# Patient Record
Sex: MALE | Race: WHITE
[De-identification: names, ages, dates, MRNs, and addresses within clinical notes are randomized per-mention and may not be internally consistent; named-entity substitution may affect disease eponyms.]

---

## 2018-06-01 ENCOUNTER — HOSPITAL ENCOUNTER (OUTPATIENT)
Dept: HOSPITAL 97 - ER | Age: 68
Setting detail: OBSERVATION
LOS: 1 days | Discharge: HOME | End: 2018-06-02
Attending: INTERNAL MEDICINE | Admitting: INTERNAL MEDICINE
Payer: COMMERCIAL

## 2018-06-01 VITALS — OXYGEN SATURATION: 98 %

## 2018-06-01 VITALS — BODY MASS INDEX: 47.2 KG/M2

## 2018-06-01 DIAGNOSIS — E11.9: ICD-10-CM

## 2018-06-01 DIAGNOSIS — I48.91: ICD-10-CM

## 2018-06-01 DIAGNOSIS — I10: ICD-10-CM

## 2018-06-01 DIAGNOSIS — Z96.651: ICD-10-CM

## 2018-06-01 DIAGNOSIS — G45.9: Primary | ICD-10-CM

## 2018-06-01 DIAGNOSIS — Z86.73: ICD-10-CM

## 2018-06-01 DIAGNOSIS — E66.01: ICD-10-CM

## 2018-06-01 DIAGNOSIS — Z95.1: ICD-10-CM

## 2018-06-01 LAB
ALBUMIN SERPL BCP-MCNC: 3.4 G/DL (ref 3.2–5.5)
ALP SERPL-CCNC: 62 IU/L (ref 42–121)
ALT SERPL W P-5'-P-CCNC: 14 IU/L (ref 10–60)
AST SERPL W P-5'-P-CCNC: 15 IU/L (ref 10–42)
BUN BLD-MCNC: 15 MG/DL (ref 6–20)
CKMB CREATINE KINASE MB: 2.6 NG/ML (ref 0.3–4)
GLUCOSE SERPLBLD-MCNC: 71 MG/DL (ref 65–120)
HCT VFR BLD CALC: 40.1 % (ref 39.6–49)
INR BLD: 1.12
LYMPHOCYTES # SPEC AUTO: 1.9 K/UL (ref 0.7–4.9)
MAGNESIUM SERPL-MCNC: 2 MG/DL (ref 1.8–2.5)
MCH RBC QN AUTO: 28.5 PG (ref 27–35)
MCV RBC: 87.8 FL (ref 80–100)
PMV BLD: 9 FL (ref 7.6–11.3)
POTASSIUM SERPL-SCNC: 4 MEQ/L (ref 3.6–5)
RBC # BLD: 4.56 M/UL (ref 4.33–5.43)

## 2018-06-01 PROCEDURE — 80061 LIPID PANEL: CPT

## 2018-06-01 PROCEDURE — 84484 ASSAY OF TROPONIN QUANT: CPT

## 2018-06-01 PROCEDURE — 70549 MR ANGIOGRAPH NECK W/O&W/DYE: CPT

## 2018-06-01 PROCEDURE — 85025 COMPLETE CBC W/AUTO DIFF WBC: CPT

## 2018-06-01 PROCEDURE — 70450 CT HEAD/BRAIN W/O DYE: CPT

## 2018-06-01 PROCEDURE — 83880 ASSAY OF NATRIURETIC PEPTIDE: CPT

## 2018-06-01 PROCEDURE — 93005 ELECTROCARDIOGRAM TRACING: CPT

## 2018-06-01 PROCEDURE — 81003 URINALYSIS AUTO W/O SCOPE: CPT

## 2018-06-01 PROCEDURE — 85610 PROTHROMBIN TIME: CPT

## 2018-06-01 PROCEDURE — 99285 EMERGENCY DEPT VISIT HI MDM: CPT

## 2018-06-01 PROCEDURE — 83735 ASSAY OF MAGNESIUM: CPT

## 2018-06-01 PROCEDURE — 70544 MR ANGIOGRAPHY HEAD W/O DYE: CPT

## 2018-06-01 PROCEDURE — 82962 GLUCOSE BLOOD TEST: CPT

## 2018-06-01 PROCEDURE — 80048 BASIC METABOLIC PNL TOTAL CA: CPT

## 2018-06-01 PROCEDURE — 70553 MRI BRAIN STEM W/O & W/DYE: CPT

## 2018-06-01 PROCEDURE — 71045 X-RAY EXAM CHEST 1 VIEW: CPT

## 2018-06-01 PROCEDURE — 36415 COLL VENOUS BLD VENIPUNCTURE: CPT

## 2018-06-01 PROCEDURE — 82550 ASSAY OF CK (CPK): CPT

## 2018-06-01 PROCEDURE — 85730 THROMBOPLASTIN TIME PARTIAL: CPT

## 2018-06-01 PROCEDURE — 80076 HEPATIC FUNCTION PANEL: CPT

## 2018-06-01 PROCEDURE — 93880 EXTRACRANIAL BILAT STUDY: CPT

## 2018-06-01 PROCEDURE — 93306 TTE W/DOPPLER COMPLETE: CPT

## 2018-06-01 PROCEDURE — 82553 CREATINE MB FRACTION: CPT

## 2018-06-01 RX ADMIN — SODIUM CHLORIDE SCH MLS: 0.9 INJECTION, SOLUTION INTRAVENOUS at 22:40

## 2018-06-01 RX ADMIN — HUMAN INSULIN SCH: 100 INJECTION, SOLUTION SUBCUTANEOUS at 21:00

## 2018-06-01 RX ADMIN — SODIUM CHLORIDE SCH MLS: 0.9 INJECTION, SOLUTION INTRAVENOUS at 16:00

## 2018-06-01 RX ADMIN — HUMAN INSULIN SCH: 100 INJECTION, SOLUTION SUBCUTANEOUS at 16:30

## 2018-06-01 NOTE — EKG
Test Date:    2018-06-01               Test Time:    11:32:53

Technician:   QUINTON                                     

                                                     

MEASUREMENT RESULTS:                                       

Intervals:                                           

Rate:         71                                     

MD:           168                                    

QRSD:         94                                     

QT:           376                                    

QTc:          408                                    

Axis:                                                

P:            31                                     

MD:           168                                    

QRS:          27                                     

T:            44                                     

                                                     

INTERPRETIVE STATEMENTS:                                       

                                                     

Normal sinus rhythm

Normal ECG

Compared to ECG 07/29/2016 17:02:59

No significant changes



Electronically Signed On 06-01-18 13:34:16 CDT by Solitario Yañez

## 2018-06-01 NOTE — XMS REPORT
Clinical Summary

 Created on:2018



Patient:Kevin Pichardo

Sex:Male

:1950

External Reference #:[id number]





Demographics







 Address  [address] [place] [address]

 

 Home Phone  [phone]

 

 Email Address  [email or web address]

 

 Preferred Language  English

 

 Marital Status  Unknown

 

 Pentecostal Affiliation  Unknown

 

 Race  Unknown

 

 Ethnic Group   or 









Author







 Organization  [facility]

 

 Address  [address] [place] [address]

 

 Phone  [phone]









Support







 Name  Relationship  Address  Phone

 

 Unavailable  Unavailable  Unavailable  [phone]









Care Team Providers







 Name  Role  Phone

 

 Unavailable  Primary Care Provider  Unavailable









Allergies

No Known Allergies



Current Medications







 Prescription  Sig.  Disp.  Refills  Start Date  End Date  Status

 

 lisinopril  Take 10 mg by          Active



 (PRINIVIL,ZESTRIL) 10  mouth daily.          



 MG tablet            

 

 metoprolol  Take 50 mg by          Active



 (LOPRESSOR) 50 MG  mouth 2 (two)          



 tablet  times daily.          

 

 metFORMIN  Take 1,000 mg by          Active



 (GLUCOPHAGE) 1000 MG  mouth 2 (two)          



 tablet  times daily with          



   breakfast and          



   dinner.          

 

 clopidogrel (PLAVIX)  Take 75 mg by          Active



 75 mg tablet  mouth daily.          

 

 aspirin 325 MG tablet  Take 1 tablet  30 tablet  0  2016    Active



   (325 mg total)          



   by mouth daily.          

 

 atorvastatin  Take 0.5 tablets  15 tablet  0  2016  



 (LIPITOR) 80 MG  (40 mg total) by          



 tablet  mouth nightly.          

 

 cyanocobalamin  Take 1 tablet  30 tablet  0  2016  



 (VITAMIN B-12) 100  (100 mcg total)          



 MCG tablet  by mouth daily.          







Active Problems







 Problem  Noted Date

 

 Stroke due to embolism of right posterior cerebral artery (HCC)  07/15/2016

 

 B12 deficiency  2016

 

 Diabetes mellitus (HCC)  

 

 Hypertension  

 

 Coronary artery disease  







Family History







 Medical History  Relation  Name  Comments

 

 Dementia  Father    

 

 Diabetes  Father    

 

 Heart disease  Father    

 

 Hypertension  Father    

 

 Stomach cancer  Mother    









 Relation  Name  Status  Comments

 

 Father      

 

 Mother      







Social History







 Tobacco Use  Types  Packs/Day  Years Used  Date

 

 Never Smoker        









 Smokeless Tobacco: Never Used      









 Alcohol Use  Drinks/Week  oz/Week  Comments

 

 No      









 Sex Assigned at Birth  Date Recorded

 

 Not on file  







Last Filed Vital Signs

Not on file



Plan of Treatment

Not on file



Results

Not on fileafter 2017

## 2018-06-01 NOTE — RAD REPORT
EXAM DESCRIPTION:  RAD - Chest Single View - 6/1/2018 11:45 am

 

CLINICAL HISTORY:  Blurred vision, hypertension, shortness of breath

 

COMPARISON:  June 2016

 

TECHNIQUE:  AP portable chest image was obtained 1118 hours .

 

FINDINGS:  No failure, infiltrate or mass. Lung markings are mildly prominent, similar to comparison.
 Heart and vasculature are normal. No measurable pleural effusion and no pneumothorax. No gross bony 
abnormality seen. No acute aortic findings suspected.

 

IMPRESSION:  No acute cardiopulmonary process.

 

Chest findings are similar to comparison.

## 2018-06-01 NOTE — RAD REPORT
EXAM DESCRIPTION:  CT - Head Brain Wo Cont - 6/1/2018 11:21 am

 

CLINICAL HISTORY:  Acute onset blurred vision

 

COMPARISON:  CT head August 2016

 

TECHNIQUE:  Axial 5 mm thick images of the head were obtained without IV contrast.

 

All CT scans are performed using dose optimization technique as appropriate and may include automated
 exposure control or mA/KV adjustment according to patient size.

 

FINDINGS:  No intracranial hemorrhage, mass, edema or shift of mid-line structures. No acute infarcti
on changes seen. No acute cortical based infarction is identified. Patient has mild to moderate atrop
hy and mild chronic ischemic change. Ventricular size is in proportion to volume loss. Diminished att
enuation is seen in the lateral aspect left cerebellum matching previously noted CVA. There is enceph
alomalacia in the medial right occipital lobe. This would be an in the visual cortex pathway but is s
imilar to the prior study. Visual loss was not further detailed to determine if deficit may localize 
to the medial right occipital lobe.

 

Mastoid air cells and visualized portions of the paranasal sinuses are clear.

 

No acute bony findings.

 

 

IMPRESSION:  No hemorrhage, mass or acute cortical based infarction.

 

Atrophy, chronic ischemic changes, old right occipital and old left cerebellum infarction changes not
ed and stable.

 

Ongoing clinical concerns for acute nonhemorrhagic CVA can be addressed with MR imaging.

## 2018-06-01 NOTE — RAD REPORT
EXAM DESCRIPTION:  MRI - Stroke Protocol - 6/1/2018 3:37 pm

 

CLINICAL HISTORY:   Blurred vision

 

COMPARISON:  June 1, 2018 head CT

 

MRI and MRA 2016

 

TECHNIQUE:  Axial, sagittal and coronal magnetic resonance images of the brain were obtained. 20 cc M
ultiHance was administered. Magnetic resonance angiography of the head and neck was performed. Source
 images were  reviewed and reconstructed at 360 degrees rotation.

 

FINDINGS:   Abnormal signal consistent with gliosis is present within the right temporal lobe extendi
ng into the right occipital lobe and left cerebellum. These likely are the sequela of old infarcts.

 

Diffusion weighted Sign ADC mapping does not reveal evidence of an acute infarction

Sinuses and mastoids are clear.

 

Old lacunar infarcts of the right cerebellum and right thalamus are seen. .

 

An extra-axial fluid collection is not seen. Hydrocephalus is not noted.

 

No abnormal enhancement is seen.

 

Flow within the left vertebral artery is not visualized on the current exam. Flow within the artery w
as seen on the 2016 MRA. The common, internal and external carotid arteries do not demonstrate a sign
ificant abnormality.

 

 The visualized anterior cerebral, middle cerebral, posterior cerebral, basilar and distal internal c
arotid arteries do not demonstrate an acute significant stenosis. An aneurysm is not seen

 

IMPRESSION:  Old cerebellar and cerebral infarcts

 

An acute infarct is not seen

 

Flow within the left vertebral artery is no longer visualized. This has developed since 2016 and prob
ably represents a dissection

 

No acute abnormality involving the arteries of the head

## 2018-06-01 NOTE — EDPHYS
Physician Documentation                                                                           

 [facility]                                                                

Name: Kevin Pichardo                                                                                 

Age: 68 yrs                                                                                       

Sex: Male                                                                                         

: 1950                                                                                   

MRN: [id number]                                                                                   

Arrival Date: 2018                                                                          

Time: 10:43                                                                                       

Account#: [id number]                                                                            

Bed 19                                                                                            

Private MD: Collin Humphreys V                                                                      

ED Physician Lanre Marinelli                                                                       

HPI:                                                                                              

                                                                                             

11:11 This 68 yrs old  Male presents to ER via Wheelchair with complaints of Blurred cp  

      Vision.                                                                                     

11:11 The patient's problem is reported as visual difficulty, blurred vision. Onset: The      cp  

      symptoms/episode began/occurred this morning, at 10:00. Duration: This was a single         

      incident, lasting a few seconds. Context: occurred while the patient was driving.           

11:11 Associated signs and symptoms: Pertinent negatives: abdominal pain, chest pain,         cp  

      headache, numbness, vomiting, weakness. Severity of symptoms: in the emergency              

      department the symptoms have resolved and did so just prior to arrival. Patient's           

      baseline: Neuro: alert and fully oriented, Motor: no deficits, Ambulation: walks            

      without assistance, Speech: normal, The patient has a previous history of CVA, TIA.         

      reports symptoms are similar to previous symptoms when diagnosed with TIA.                  

                                                                                                  

Historical:                                                                                       

- Allergies:                                                                                      

10:52 No Known Allergies;                                                                     aj  

- Home Meds:                                                                                      

11:30 amlodipine 5 mg tab 1 tab once daily [Active]; atorvastatin 40 mg oral tab 1 tab once   em  

      daily [Active]; hydrochlorothiazide 25 mg Oral tab 1 tab once daily [Active];               

      lisinopril 20 mg Oral tab 1 tab once daily [Active]; metoprolol tartrate 50 mg Oral tab     

      1 tab 2 times per day [Active]; warfarin 3 mg Oral tab 2 tabs once daily [Active];          

- PMHx:                                                                                           

10:52 CVA; Diabetes - NIDDM; Hyperlipidemia; Hypertension;                                    aj  

                                                                                                  

- Immunization history:: Adult Immunizations up to date.                                          

- Social history:: Smoking status: Patient/guardian denies using tobacco.                         

- Ebola Screening: : Patient negative for fever greater than or equal to 101.5 degrees            

  Fahrenheit, and additional compatible Ebola Virus Disease symptoms Patient denies               

  exposure to infectious person Patient denies travel to an Ebola-affected area in the            

  21 days before illness onset No symptoms or risks identified at this time.                      

                                                                                                  

                                                                                                  

ROS:                                                                                              

11:20 Constitutional: Negative for body aches, chills, fever, poor PO intake.                 cp  

11:20 Eyes: Positive for history of blurry vision, Negative for discharge, pain, photophobia, cp  

      redness.                                                                                    

11:20 ENT: Negative for drainage from ear(s), ear pain, sore throat, difficulty swallowing,       

      difficulty handling secretions.                                                             

11:20 Neck: Negative for pain with movement, pain at rest, stiffness, tenderness, bony            

      tenderness.                                                                                 

11:20 Cardiovascular: Negative for chest pain, edema, palpitations.                               

11:20 Respiratory: Negative for cough, shortness of breath, wheezing.                             

11:20 Abdomen/GI: Negative for abdominal pain, nausea, vomiting, and diarrhea, constipation,      

      anorexia, dysphagia, black/tarry stool, rectal bleeding.                                    

11:20 Back: Negative for pain at rest, pain with movement, radiated pain.                         

11:20 : Negative for urinary symptoms.                                                          

11:20 MS/extremity: Negative for injury or acute deformity, decreased range of motion, pain,      

      paresthesias.                                                                               

11:20 Skin: Negative for cellulitis, rash.                                                        

11:20 Neuro: Negative for altered mental status, headache, syncope, near syncope, weakness.       

11:20 All other systems are negative.                                                             

                                                                                                  

Exam:                                                                                             

11:27 Constitutional: The patient appears in no acute distress, alert, awake,                 cp  

      non-diaphoretic, non-toxic, well developed, well nourished, obese.                          

11:27 Head/Face:  Normocephalic, atraumatic. Eyes:  Pupils equal round and reactive to light, cp  

      extra-ocular motions intact.  Lids and lashes normal.  Conjunctiva and sclera are           

      non-icteric and not injected.  Cornea within normal limits.  Periorbital areas with no      

      swelling, redness, or edema. ENT:  Nares patent. No nasal discharge, no septal              

      abnormalities noted.  Tympanic membranes are normal and external auditory canals are        

      clear.  Oropharynx with no redness, swelling, or masses, exudates, or evidence of           

      obstruction, uvula midline.  Mucous membranes moist. Chest/axilla:  Normal chest wall       

      appearance and motion.  Nontender with no deformity.  No lesions are appreciated.           

11:27 Cardiovascular: Rate: normal, Rhythm: regular, Pulses: Pulses are 2+ in right radial        

      artery and left radial artery. Edema: is not appreciated, JVD: is not appreciated.          

11:27 Respiratory: the patient does not display signs of respiratory distress,  Respirations:     

      normal, no use of accessory muscles, no retractions, no splinting, no tachypnea,            

      labored breathing, is not present, Breath sounds: are clear throughout, no decreased        

      breath sounds, no stridor, no wheezing.                                                     

11:27 Abdomen/GI: Exam negative for discomfort, distension, guarding, Inspection: abdomen         

      appears normal.                                                                             

11:27 Back: pain, is absent, ROM is normal.                                                       

11:27 Skin: cellulitis, is not appreciated, no rash present.                                      

11:27 Neuro: Orientation: to person, place \T\ time. Mentation: lucid, able to follow commands,   

      Cerebellar function: is grossly normal, Motor: moves all fours, strength is normal,         

      Sensation: no obvious gross deficits, Gait: is steady.                                      

11:57 Radiologist reports: no acute findings                                                    

                                                                                                  

Vital Signs:                                                                                      

10:52  / 81; Pulse 74; Resp 16; Temp 98.3; Pulse Ox 96% on R/A; Weight 142.88 kg;       aj  

      Height 5 ft. 9 in. (175.26 cm);                                                             

11:30  / 82; Pulse 71; Resp 16; Pulse Ox 99% ; Pain 0/10;                               em  

12:30  / 79; Pulse 68; Resp 14; Pulse Ox 100% on R/A;                                   em  

13:30  / 81; Pulse 65; Resp 16; Pulse Ox 99% on R/A; Pain 0/10;                         em  

14:30  / 78; Pulse 77; Resp 18; Pulse Ox 99% on R/A;                                    em  

16:32  / 85; Pulse 74; Resp 16; Temp 97.5(O); Pulse Ox 98% on R/A; Pain 0/10;           em  

10:52 Body Mass Index 46.52 (142.88 kg, 175.26 cm)                                            aj  

                                                                                                  

MDM:                                                                                              

10:57 Patient medically screened.                                                             cp  

12:00 Differential diagnosis: CVA, TIA, metabolic disorder, drug effects.                       

12:35 Data reviewed: vital signs, nurses notes, lab test result(s), EKG, radiologic studies,  cp  

      CT scan, plain films.                                                                       

12:35 Test interpretation: by ED physician or midlevel provider: ECG, plain radiologic        cp  

      studies.                                                                                    

12:40 Physician consultation: Flakito Dyson MD was called at 12:40, was contacted at 12:41,       

      regarding consult, and will see patient in inpatient room.                                  

12:47 Physician consultation: Collin Humphreys MD was called at 12:47, left message on voicemail.   

14:30 Physician consultation: Collin Humphreys MD was contacted at 14:30, regarding admission, to   

      the telemetry unit. patient's condition.                                                    

                                                                                                  

                                                                                             

11:09 Order name: Basic Metabolic Panel; Complete Time: 14:30                                   

                                                                                             

11:09 Order name: BNP; Complete Time: 12:27                                                     

                                                                                             

11:09 Order name: CBC with Diff; Complete Time: 12:01                                           

                                                                                             

12:01 Interpretation: Normal except: HGB 13.0; RDW 15.4; EOSINOPHIL % 7.3; EOSA 0.7.            

                                                                                             

11:09 Order name: Ckmb; Complete Time: 14:30                                                    

                                                                                             

11:09 Order name: CPK; Complete Time: 14:30                                                     

                                                                                             

11:09 Order name: LFT's; Complete Time: 14:30                                                   

                                                                                             

11:09 Order name: Magnesium; Complete Time: 14:30                                               

                                                                                             

11:09 Order name: PT-INR; Complete Time: 12:27                                                  

                                                                                             

11:09 Order name: Ptt, Activated; Complete Time: 12:27                                          

                                                                                             

11:09 Order name: Troponin (emerg Dept Use Only); Complete Time: 12:27                          

                                                                                             

12:27 Interpretation: Within normal limits: TROPED < 0.03.                                      

                                                                                             

13:10 Order name: Urine Dipstick--Ancillary (enter results)                                     

                                                                                             

13:10 Order name: Urine Dipstick-Ancillary; Complete Time: 14:30                              [facility]

                                                                                             

15:24 Order name: CKMB Creatine Kinase MB                                                     [facility]

                                                                                             

15:24 Order name: CKMB Creatine Kinase MB                                                     [facility]

                                                                                             

11:09 Order name: XRAY Chest (1 view); Complete Time: 14:30                                     

                                                                                             

11:09 Order name: EKG; Complete Time: 11:09                                                     

                                                                                             

11:09 Order name: CT Head Brain wo Cont; Complete Time: 11:53                                   

                                                                                             

12:03 Interpretation: Report reviewed.                                                          

                                                                                             

12:47 Order name: Stroke Protocol                                                             [facility]

                                                                                             

13:17 Order name: Diet Ada 2000 Obed; Complete Time: 13:18                                     em  

                                                                                             

15:24 Order name: CONS Physician Consult                                                      [facility]

                                                                                             

15:24 Order name: Echo with Doppler                                                           [facility]

                                                                                             

15:24 Order name: Creatine Phosphokinase                                                      [facility]

                                                                                             

15:24 Order name: Creatine Phosphokinase                                                      [facility]

                                                                                             

15:24 Order name: Lipid Profile                                                               [facility]

                                                                                             

15:24 Order name: Lipid Profile                                                               [facility]

                                                                                             

15:24 Order name: Troponin I                                                                  [facility]

                                                                                             

15:24 Order name: Troponin I                                                                  [facility]

                                                                                             

15:24 Order name: Carotid Artery Bilateral                                                    [facility]

                                                                                             

11:09 Order name: Cardiac monitoring; Complete Time: 12:30                                    cp  

                                                                                             

11:09 Order name: EKG - Nurse/Tech; Complete Time: 12:30                                      cp  

                                                                                             

11:09 Order name: IV Saline Lock; Complete Time: 12:30                                        cp  

                                                                                             

11:09 Order name: Labs collected and sent; Complete Time: 12:30                               cp  

                                                                                             

11:09 Order name: O2 Per Protocol; Complete Time: 11:17                                       cp  

                                                                                             

11:09 Order name: O2 Sat Monitoring; Complete Time: 11:17                                     cp  

                                                                                             

11:09 Order name: Urine Dipstick-Ancillary (obtain specimen); Complete Time: 13:30            cp  

                                                                                             

11:09 Order name: Accucheck Blood Glucose; Complete Time: 11:17                               cp  

                                                                                             

13:00 Order name: Labs - recollect needed; Complete Time: 13:17                               bd  

                                                                                                  

Administered Medications:                                                                         

12:20 Drug: Aspirin Chewable Tablet 324 mg Route: PO;                                         em  

13:27 Follow up: Response: No adverse reaction                                                em  

16:25 Drug: Xarelto 15 mg Route: PO;                                                          em  

16:42 Follow up: Response: No adverse reaction                                                em  

                                                                                                  

                                                                                                  

Point of Care Testing:                                                                            

      Blood Glucose:                                                                              

11:10 Blood Glucose: 96 mg/dL;                                                                em  

      Ranges:                                                                                     

      Critical Glucose Levels:Adult <50 mg/dl or >400 mg/dl  <40 mg/dl or >180 mg/dl       

Disposition:                                                                                      

17:30 Chart complete.                                                                         cp  

                                                                                                  

Disposition:                                                                                      

18 14:34 Hospitalization ordered by Collin Humphreys for Observation. Preliminary diagnosis    

  is Transient cerebral ischemic attack, unspecified.                                             

- Bed requested for Telemetry/MedSurg (Inpatient).                                                

- Status is Observation.                                                                      em  

- Condition is Stable.                                                                            

- Problem is new.                                                                                 

- Symptoms are resolved.                                                                          

UTI on Admission? No                                                                              

                                                                                                  

                                                                                                  

                                                                                                  

Addendum:                                                                                         

2018                                                                                        

     14:00 Co-signature as Attending Physician, Lanre Marinelli MD.                                  g
s

                                                                                                  

Signatures:                                                                                       

Dispatcher [employer]                           EDFatou Villatoro Diana, RN                        RN   Taya Nicholas RN                       RN   aj                                                   

Carlos, Ignacio, LVN                       LVN  em                                                   

Sorin Krishnan, PA                         PA   Lanre Brody MD MD                                                      

                                                                                                  

Corrections: (The following items were deleted from the chart)                                    

                                                                                             

12:01 12:01 Normal except: HGB 13.0; RDW 15.4; EOSINOPHIL % 7.3. cp                           cp  

16:13 14:34 Hospitalization Ordered by Collin Humphreys MD for Observation. Preliminary diagnosis dw  

      is Transient cerebral ischemic attack, unspecified. Bed requested for Telemetry/MedSurg     

      (Inpatient). Status is Observation. Condition is Stable. Problem is new. Symptoms are       

      resolved. UTI on Admission? No. cp                                                          

17:12 16:13 2018 14:34 Hospitalization Ordered by Collin Humphreys MD for Observation.      em  

      Preliminary diagnosis is Transient cerebral ischemic attack, unspecified. Bed requested     

      for Telemetry/MedSurg (Inpatient). Status is Observation. Condition is Stable. Problem      

      is new. Symptoms are resolved. UTI on Admission? No. dw                                     

                                                                                                  

**************************************************************************************************

## 2018-06-01 NOTE — ER
Nurse's Notes                                                                                     

 [facility]                                                                

Name: Kevin Pichardo                                                                                 

Age: 68 yrs                                                                                       

Sex: Male                                                                                         

: 1950                                                                                   

MRN: [id number]                                                                                   

Arrival Date: 2018                                                                          

Time: 10:43                                                                                       

Account#: [id number]                                                                            

Bed 19                                                                                            

Private MD: Collin Humphreys V                                                                      

Diagnosis: Transient cerebral ischemic attack, unspecified                                        

                                                                                                  

Presentation:                                                                                     

                                                                                             

10:50 Presenting complaint: Patient states: Sudden onset blurred vision at 1000 this AM, that aj  

      has since resolved. Patient reports similar symptoms just prior to CVA 2 years ago.         

      Transition of care: patient was not received from another setting of care. Onset of         

      symptoms was 2018. Risk Assessment: Do you want to hurt yourself or someone        

      else? Patient reports no desire to harm self or others. Care prior to arrival: None.        

10:50 Method Of Arrival: Wheelchair                                                           aj  

10:50 Acuity: MINGO 3                                                                           aj  

10:53 No acute neurological deficit is noted.                                                 aj  

12:27 Pre-hospital glucose is not applicable to this patient.                                 em  

12:29 Initial Sepsis Screen: Does the patient meet any 2 criteria? No. Patient's initial      em  

      sepsis screen is negative. Does the patient have a suspected source of infection? No.       

      Patient's initial sepsis screen is negative.                                                

                                                                                                  

Triage Assessment:                                                                                

10:52 General: Appears in no apparent distress. comfortable, Behavior is calm, cooperative,   aj  

      appropriate for age. Pain: Denies pain. Neuro: Reports blurred vision resolved.             

      Respiratory: Airway is patent Respiratory effort is even, unlabored, Respiratory            

      pattern is regular, symmetrical. Derm: Skin is intact, is healthy with good turgor,         

      Skin is pink, warm \T\ dry. normal.                                                         

10:53 The onset of the patients symptoms was 2018 at 10:00.                          aj  

                                                                                                  

Stroke Activation:                                                                                

 Physician: Stroke Attending; Name: ; Notified At: ; Arrived At:                                  

 Physician: Chief Stroke Resident; Name: ; Notified At: ; Arrived At:                             

 Physician: Stroke Resident; Name: ; Notified At: ; Arrived At:                                   

 Physician: ED Attending; Name: ; Notified At: ; Arrived At:                                      

 Physician: ED Resident; Name: ; Notified At: ; Arrived At:                                       

10:53 syptoms resolved PTA                                                                    aj  

                                                                                                  

Historical:                                                                                       

- Allergies:                                                                                      

10:52 No Known Allergies;                                                                     aj  

- Home Meds:                                                                                      

11:30 amlodipine 5 mg tab 1 tab once daily [Active]; atorvastatin 40 mg oral tab 1 tab once   em  

      daily [Active]; hydrochlorothiazide 25 mg Oral tab 1 tab once daily [Active];               

      lisinopril 20 mg Oral tab 1 tab once daily [Active]; metoprolol tartrate 50 mg Oral tab     

      1 tab 2 times per day [Active]; warfarin 3 mg Oral tab 2 tabs once daily [Active];          

- PMHx:                                                                                           

10:52 CVA; Diabetes - NIDDM; Hyperlipidemia; Hypertension;                                    aj  

                                                                                                  

- Immunization history:: Adult Immunizations up to date.                                          

- Social history:: Smoking status: Patient/guardian denies using tobacco.                         

- Ebola Screening: : Patient negative for fever greater than or equal to 101.5 degrees            

  Fahrenheit, and additional compatible Ebola Virus Disease symptoms Patient denies               

  exposure to infectious person Patient denies travel to an Ebola-affected area in the            

  21 days before illness onset No symptoms or risks identified at this time.                      

                                                                                                  

                                                                                                  

Screenin:30 Abuse screen: Denies threats or abuse. Nutritional screening: No deficits noted.        em  

      Tuberculosis screening: No symptoms or risk factors identified. Fall Risk None              

      identified.                                                                                 

                                                                                                  

Assessment:                                                                                       

11:10 General: Appears in no apparent distress. comfortable, Behavior is calm, cooperative,   em  

      Reports reports having "vision problems" denies blurry reports "hazy" felt almost the       

      same as last TIA, denies headache, weakness, numbness, N/V. Pain: Denies pain. Neuro:       

      Level of Consciousness is alert, obeys commands, Oriented to person, place, time,           

      situation,  are equal bilaterally Moves all extremities. Speech is normal, Facial      

      symmetry appears normal, Pupils are PERRLA, Denies weakness blurred vision difficulty       

      swallowing, headache. Cardiovascular: Capillary refill < 3 seconds Patient's skin is        

      warm and dry. Respiratory: Airway is patent Respiratory effort is even, unlabored,          

      Respiratory pattern is regular, symmetrical. GI: Abdomen is round non-distended. EENT:      

      No signs and/or symptoms were reported regarding the EENT system. Derm: Skin is intact,     

      Skin is pink, warm \T\ dry. Musculoskeletal: Range of motion: intact in all extremities.    

11:10 Reassessment: I agree with assessment completed by Ignacio Gonzalez LVN .                   aa5 

11:19 Patient has been NPO before screening. The patient is alert, and able to follow         em  

      commands. The patient does not exhibit slurred or garbled speech. The patient is not        

      exhibiting difficulty speaking. The patient does not exhibit difficulty understanding       

      words. The patient is able to swallow own secretions with no drooling or need for           

      suction. Patient tolerated one teaspoon of water. No drooling, immediate coughing,          

      gurgling, or clearing of the throat was noted. The patient tolerated 90mL of water. No      

      drooling, immediate coughing, gurgling, or clearing of the throat was noted. The            

      patient passed the bedside swallow screening. Oral medications may be given as ordered.     

      Contact Physician for further diet orders. Provider notified of bedside swallow             

      screening results: Sorin HOFFMAN.                                                           

12:01 Reassessment: Patient appears in no apparent distress at this time. Patient and/or      em  

      family updated on plan of care and expected duration. Pain level reassessed. Patient is     

      alert, oriented x 3, equal unlabored respirations, skin warm/dry/pink. Patient denies       

      pain at this time.                                                                          

13:00 Reassessment: Patient appears in no apparent distress at this time. Patient and/or      em  

      family updated on plan of care and expected duration. Pain level reassessed. Patient is     

      alert, oriented x 3, equal unlabored respirations, skin warm/dry/pink. labs redrawn and     

      sent to lab.                                                                                

13:45 Reassessment: Patient appears in no apparent distress at this time. Patient and/or      em  

      family updated on plan of care and expected duration. Pain level reassessed. pt eating      

      lunch tray, tolerated well.                                                                 

14:30 Reassessment: Patient appears in no apparent distress at this time. Patient and/or      em  

      family updated on plan of care and expected duration. Pain level reassessed. Patient is     

      alert, oriented x 3, equal unlabored respirations, skin warm/dry/pink.                      

15:35 Reassessment: pt in MRI at this time.                                                   em  

16:36 Reassessment: Patient appears in no apparent distress at this time. Patient and/or      em  

      family updated on plan of care and expected duration. Pain level reassessed. Patient is     

      alert, oriented x 3, equal unlabored respirations, skin warm/dry/pink.                      

17:08 Reassessment: Patient appears in no apparent distress at this time. Patient and/or      em  

      family updated on plan of care and expected duration. Pain level reassessed. Patient is     

      alert, oriented x 3, equal unlabored respirations, skin warm/dry/pink. Patient denies       

      pain at this time.                                                                          

                                                                                                  

Vital Signs:                                                                                      

10:52  / 81; Pulse 74; Resp 16; Temp 98.3; Pulse Ox 96% on R/A; Weight 142.88 kg;       aj  

      Height 5 ft. 9 in. (175.26 cm);                                                             

11:30  / 82; Pulse 71; Resp 16; Pulse Ox 99% ; Pain 0/10;                               em  

12:30  / 79; Pulse 68; Resp 14; Pulse Ox 100% on R/A;                                   em  

13:30  / 81; Pulse 65; Resp 16; Pulse Ox 99% on R/A; Pain 0/10;                         em  

14:30  / 78; Pulse 77; Resp 18; Pulse Ox 99% on R/A;                                    em  

16:32  / 85; Pulse 74; Resp 16; Temp 97.5(O); Pulse Ox 98% on R/A; Pain 0/10;           em  

10:52 Body Mass Index 46.52 (142.88 kg, 175.26 cm)                                            aj  

                                                                                                  

ED Course:                                                                                        

10:43 Patient arrived in ED.                                                                  sb2 

10:44 Collin Humphreys MD is Private Physician.                                                 sb2 

10:51 Triage completed.                                                                       aj  

10:53 Arm band placed on left wrist. Patient placed in an exam room.                          aj  

10:57 Sorin Krishnan PA is PHCP.                                                                cp  

10:57 Lanre Marinelli MD is Attending Physician.                                              cp  

10:59 Ignacio Gonzalez LVN is Primary Nurse.                                                     em  

11:10 Patient has correct armband on for positive identification. Placed in gown. Bed in low  em  

      position. Side rails up X 1. Adult w/ patient.                                              

11:20 CT completed. Patient tolerated procedure well. Patient moved to CT via wheelchair.     sj  

      Patient moved back from CT.                                                                 

11:20 CT Head Brain wo Cont In Process Unspecified.                                           EDMS

11:40 Initial lab(s) drawn, by me, sent to lab. Inserted saline lock: 20 gauge in right       em  

      antecubital area, using aseptic technique. Blood collected.                                 

11:45 XRAY Chest (1 view) In Process Unspecified.                                             EDMS

11:45 EKG done, by EKG tech. reviewed by Sorin HOFFMAN.                                       at1 

12:25 No provider procedures requiring assistance completed.                                  em  

14:30 Patient moved to MRI via wheelchair.                                                    ka  

14:32 Collin Humphreys MD is Hospitalizing Provider.                                            cp  

15:32 Stroke Protocol In Process Unspecified.                                                 EDMS

16:24 Echocardiogram with Doppler done by tech.                                               dt2 

17:09 Patient admitted, IV remains in place.                                                  em  

                                                                                                  

Administered Medications:                                                                         

12:20 Drug: Aspirin Chewable Tablet 324 mg Route: PO;                                         em  

13:27 Follow up: Response: No adverse reaction                                                em  

16:25 Drug: Xarelto 15 mg Route: PO;                                                          em  

16:42 Follow up: Response: No adverse reaction                                                em  

                                                                                                  

                                                                                                  

Point of Care Testing:                                                                            

      Blood Glucose:                                                                              

11:10 Blood Glucose: 96 mg/dL;                                                                em  

      Ranges:                                                                                     

                                                                                                  

Outcome:                                                                                          

14:34 Decision to Hospitalize by Provider.                                                    cp  

17:09 Admitted to Tele accompanied by tech, via wheelchair, room 428, with chart, Report      em  

      called to  JAVAD Shanks                                                                      

17:09 Condition: good                                                                             

17:09 Instructed on the need for admit, Demonstrated understanding of instructions.               

17:12 Patient left the ED.                                                                    em  

                                                                                                  

Signatures:                                                                                       

Dispatcher [employer]                           Taya Quinn, RN                       RN   Edelmira Gonzalez Edgar, LVN                       LVN  em                                                   

Doreen Vines, RN                     RN   aa5                                                  

Taya franco, EKG Tech              EKG Tat1                                                  

Sorin Krishnan PA                         PA   cp                                                   

Stephie Delacruz                            ka                                                   

Lisha Tineo                               sb2                                                  

Annita Mcknight                             dt2                                                  

                                                                                                  

**************************************************************************************************

## 2018-06-02 VITALS — DIASTOLIC BLOOD PRESSURE: 77 MMHG | SYSTOLIC BLOOD PRESSURE: 154 MMHG | TEMPERATURE: 97.6 F

## 2018-06-02 LAB
CKMB CREATINE KINASE MB: 1.8 NG/ML (ref 0.3–4)
HDLC SERPL-MCNC: 42 MG/DL (ref 27–67)
LDLC SERPL CALC-MCNC: 91 MG/DL (ref ?–130)

## 2018-06-02 RX ADMIN — HUMAN INSULIN SCH: 100 INJECTION, SOLUTION SUBCUTANEOUS at 11:30

## 2018-06-02 RX ADMIN — HUMAN INSULIN SCH: 100 INJECTION, SOLUTION SUBCUTANEOUS at 07:30

## 2018-06-02 NOTE — RAD REPORT
EXAM DESCRIPTION:  VAS - CP - 6/1/2018 10:25 pm

 

CLINICAL HISTORY:  TIA, possible CVA

 

COMPARISON:  MRI Stroke protocol June 1st, CT head June 1st

 

TECHNIQUE:  Real-time sonographic evaluation of both carotid systems was performed. Doppler interroga
tion was performed with waveform tracing bilaterally.

 

FINDINGS:  Normal high resistance waveforms are noted in both external carotid arteries. The common c
arotid arteries and internal carotid arteries show normal low resistance waveforms.

 

Mild to moderate plaquing changes are identified in the bilateral carotid vasculature. None of the pl
aquing changes cause a significant narrowing of the vessel lumen. No dissection changes are identifia
ble. Peak systolic and end-diastolic velocity values fall within a normal range for the carotid vascu
lature. ICA/ CCA ratios are normal. No significant asymmetry of the velocity values right versus left
. Minimal or diminished flow seen along the expected course of the left vertebral artery. The left ve
rtebral artery was absent or poorly visualized on the MRA imaging. The left vertebral artery is not c
ongenitally small. This represents either significant atherosclerotic disease or dissection, either o
f which causes absent or reduced flow. The vertebral artery findings on this study may be a small, sl
ow flow vertebral artery or any muscular branch that can mimic a vertebral artery.

 

Velocity values and ratios were recorded and are retained in the patient's imaging records.

 

 

IMPRESSION:  Mild carotid plaquing changes causing no significant luminal narrowing. No carotid steno
ses seen.

 

Significant flow reduction in the left vertebral artery. The left vertebral artery findings on this u
ltrasound study may reflect significant atherosclerotic or dissection change reducing blood flow. Mus
cular branches can also mimic a vertebral artery. Any dissection etiology is not likely acute.

## 2018-06-02 NOTE — P.SSS
Patient History


Date of Service: 06/02/18


Reason for admission: VISION LOSS AND DOUBLE VISION.


History of Present Illness: 





MR. MAIER IS MORBIDLY OBESE, DIABETIC WITH A FIB, HISTORY OF CVA AND HTN COMES 

WITH ABOVE NEURO SYMPTOMS.  HIS INR IS SUBTHERAPEUTIC.  HE DOES NOT WATCH DIET.

  I PUT HIM ON XARELTO AND HE WILL HAVE TO AFFORD IT AS WARFARIN IS NOT WORKING 

FOR HIM TO PREVENT STROKE.  HE IS STABLE TO GO HOME.


Allergies





No Known Allergies Allergy (Unverified 06/30/16 14:17)


 





Home Medications: 








Amlodipine [Norvasc*] 5 mg PO DAILY 06/01/18 


Atorvastatin Calcium [Lipitor] 40 mg PO BEDTIME 06/01/18 


Dulaglutide [Trulicity] 1 stick SQ EVERY 7TH DAY 06/01/18 


Glimepiride 8 mg PO DAILY WITH BREAKFAST 06/01/18 


Hydrochlorothiazide 25 mg PO DAILY 06/01/18 


Lisinopril [Prinivil*] 20 mg PO DAILY 06/01/18 


Metformin HCl [Metformin HCl ER] 1,000 mg PO BID 06/01/18 


Metoprolol Tartrate [Lopressor*] 50 mg PO BID 06/01/18 


Rivaroxaban [Xarelto*] 20 mg PO DAILY AT SUPPER #30 tablet 06/02/18 








- Past Medical/Surgical History


Has patient received pneumonia vaccine in the past: Yes


Diabetic: Yes


-: diverticulosis


-: hypertension


-: CVA (7/16)


-: TIA (7/16)


-: triple bypass 2015


-: Right knee replacement


-: Quang heel spurs requiring surgery





- Social History


Smoking Status: Never smoker


Alcohol use: No


CD- Drugs: No


Caffeine use: Yes


Place of Residence: Home





Review of Systems


10-point ROS is otherwise unremarkable





Physical Examination





- Vital Signs


Temperature: 97.6 F


Blood Pressure: 154/77


Pulse: 78


Respirations: 20


Pulse Ox (%): 94





- Physical Exam


General: Alert, In no apparent distress, Obese


HEENT: Atraumatic, PERRLA, Mucous membr. moist/pink, EOMI, Sclerae nonicteric


Neck: Supple, 2+ carotid pulse no bruit, No LAD, Without JVD or thyroid 

abnormality


Respiratory: Clear to auscultation bilaterally, Normal air movement


Cardiovascular: Regular rate/rhythm, Normal S1 S2


Gastrointestinal: Normal bowel sounds, No tenderness


Musculoskeletal: No tenderness


Integumentary: No rashes


Neurological: Normal gait, Normal speech, Normal strength at 5/5 x4 extr, 

Normal tone, Normal affect


Lymphatics: No axilla or inguinal lymphadenopathy





- Diagnosis (Problem(s))


(1) TIA (transient ischemic attack)


Status: Acute   


Plan: 


AS ABOVE TO BE ON XARELTO NOW 


STOP WARFARIN NOT WORKING , NOT THERAPEUTIC AND NOT COMPLIANT.


WEIGHT LOSS ADVISED.








(2) A-fib


Status: Acute   





(3) DM type 2 (diabetes mellitus, type 2)


Onset Date: 07/05/16   Status: Acute   





- Disposition


Disposition: ROUTINE DISCHARGE


Condition: FAIR


Patient Discharge Instructions: LOSE WEIGHT.  EAT BETTER. AVOID ALL FRIED FOOD, 

FLOUR PRODUCTS, CHEESE, BUTTER, PROCESSED FOOD.  EAT ORGANIC VEGETABLES, FRUITS 

AND CHICKEN, TURKEY OR FISH.  LOSING WEIGHT WILL MAKE YOU HAVE LONGER AND 

COMFORTABLE LIFE AND WE WILL BE ABLE TO GET OFF SOME MEDICATIONS.


Diet: ADA

## 2018-06-02 NOTE — CON
Date of Consultation:  06/01/2018



Time:  2020.



Reason:  TIA.



History:  A 68-year-old gentleman with a history of diabetes, hypertension, hyperlipidemia, prior mul
tiple strokes, all posterior circulation, bilateral occipital, bilateral cerebellar, failed dual anti
-platelet therapy, diseased left vertebral on imaging, then placed on Coumadin, which did bring the r
ecurrent stroke to a halt, but we have been plagued with subtherapeutic and highly variable INRs.  Se
en in the office within the last 2 months.  He had an episode today where he had transient loss in th
e right hemifield coming into the central and lower part of his vision.  He was driving.  He stopped 
driving.  His wife brought him to the Emergency Department.  Entire symptom complex lasted less than 
5 minutes.  CT scan of the brain negative for acute events.  Prior strokes were present.  INR was sub
therapeutic and that has been a problem we have had as alluded to in the initial history.  Since maye grimaldo admitted, brain MRI.  No evidence of an acute stroke.  The left vertebral is nonvisualized now and 
possibly chronically dissected.  Consultation was requested.



Past Medical History:  As alluded to.



Routine Medications:  Trulicity, Lopressor, lisinopril, hydrochlorothiazide, Lipitor, metformin, glim
epiride, Norvasc, Coumadin.



Allergies:  NONE.



Social History:  .  Does not drink or smoke.  Normally independent with activities of daily li
ving.



Family History:  Noncontributory.



Review of Systems:

General:  Good health given all the aforementioned. 

Eyes:  He has chronic left upper quadrant vision loss. 

Ears, Nose, Throat:  No dysarthria.  No aphasia with this event. 

Cardiovascular:  Hypertension. 

Pulmonary:  Negative. 

GI:  Negative. 

:  Negative. 

Musculoskeletal:  Negative. 

Neurologic:  As noted. 

Psychiatric:  Negative. 

Endocrine:  Diabetes. 

Hematologic:  Systemic anticoagulation.



Physical Examination:

General:  He is afebrile.  Vitals are stable.  He is awake, alert, oriented to time, person, place, a
nd situation.  No aphasia.  No dysarthria.  Pupils reactive.  Ocular motion full.  Fields full, chron
ic left upper quadrant anopsia to confrontation.  Facial strength and sensation normal.  Tongue protr
udes evenly.  Soft palate elevates symmetrically bilaterally.  

Extremities:  Strength full.  Sensation decreased symmetrically distally.  No cortical extinction.  R
eflex is trace.  Right toe is upgoing.  Left toe is downgoing.  Cerebellar exam demonstrates no ataxi
a.  Gait is normal.



Pertinent Laboratory Data:  Imaging as noted.  INR as noted.  CBC is normal, other than hemoglobin of
 13.  Electrolytes are normal.  Lipids are pending.



Impression:  Transient ischemic attack, probable vertebral dissection.



Plan:  We will convert the Coumadin over to Xarelto and continue that for his systemic anticoagulatio
n, long-term.  It is the off-label use, reviewed with the patient.  Creatinine is 0.97, so we will us
e 20 mg in the evening dosing. 



Thank you for the consult.  We will continue to follow with you.





RAZA

DD:  06/02/2018 16:30:38Voice ID:  [id number]

DT:  06/02/2018 16:54:55Report ID:  [id number]

## 2018-06-04 NOTE — ECHO
HEIGHT: 5 ft 9 in   WEIGHT: 320 lb 0 oz   DATE OF STUDY: 06/01/2018   REFER DR: Soirn Krishnan

2-DIMENSIONAL: YES

     M.MODE: YES

 DOPPLER: YES

COLOR FLOW: YES



                    TDS:  

PORTABLE: 

 DEFINITY:  

BUBBLE STUDY: 





DIAGNOSIS:  TRANSIENT ISCHEMIC ATTACK



CARDIAC HISTORY:  

CATHERIZATION: NO

SURGERY: TRIPPLE BYPASS

PROSTHETIC VALVE: NO

PACEMAKER: NO





MEASUREMENTS (cm)

    DIASTOLIC (NORMALS)             SYSTOLIC (NORMALS)

IVSd                 1.3 (0.6-1.2)                    LA Diam 4.0 (1.9-4.0)     LVEF       
  60-69%  

LVIDd               4.1 (3.5-5.7)                        LVIDs      2.2 (2.0-3.5)     %FS  
        45%

LVPWd             1.2 (0.6-1.2)

Ao Diam           2.8 (2.0-3.7)



2 DIMENSIONAL ASSESSMENT:

RIGHT ATRIUM:                   NORMAL

LEFT ATRIUM:       DILATED



RIGHT VENTRICLE:            NORMAL

LEFT VENTRICLE: LEFT VENTRICULAR HYPERTROPHY



TRICUSPID VALVE:             NORMAL

MITRAL VALVE:     NORMAL



PULMONIC VALVE:             NORMAL

AORTIC VALVE:     NORMAL



PERICARDIAL EFFUSION: NONE

AORTIC ROOT:      NORMAL





LEFT VENTRICULAR WALL MOTION:     NORMAL



DOPPLER/COLOR FLOW:     NORMAL



COMMENTS:      NORMAL LEFT VENTRICULAR EJECTION FRACTION.  LEFT VENTRICULAR HYPERTROPHY, 
CONCENTRIC.  DILATED LEFT ATRIUM.



TECHNOLOGIST:   SHASHANK CORCORAN

## 2024-10-21 ENCOUNTER — HOSPITAL ENCOUNTER (INPATIENT)
Dept: HOSPITAL 97 - 2ND | Age: 74
LOS: 1 days | Discharge: HOME | DRG: 309 | End: 2024-10-22
Attending: INTERNAL MEDICINE | Admitting: INTERNAL MEDICINE
Payer: COMMERCIAL

## 2024-10-21 VITALS — BODY MASS INDEX: 40.6 KG/M2

## 2024-10-21 DIAGNOSIS — I48.91: Primary | ICD-10-CM

## 2024-10-21 DIAGNOSIS — E66.01: ICD-10-CM

## 2024-10-21 DIAGNOSIS — Z85.46: ICD-10-CM

## 2024-10-21 DIAGNOSIS — I25.10: ICD-10-CM

## 2024-10-21 DIAGNOSIS — Z86.73: ICD-10-CM

## 2024-10-21 DIAGNOSIS — Z79.899: ICD-10-CM

## 2024-10-21 DIAGNOSIS — Z79.84: ICD-10-CM

## 2024-10-21 DIAGNOSIS — I10: ICD-10-CM

## 2024-10-21 DIAGNOSIS — N39.0: ICD-10-CM

## 2024-10-21 DIAGNOSIS — F32.A: ICD-10-CM

## 2024-10-21 DIAGNOSIS — E11.40: ICD-10-CM

## 2024-10-21 DIAGNOSIS — Z79.01: ICD-10-CM

## 2024-10-21 DIAGNOSIS — Z95.1: ICD-10-CM

## 2024-10-21 LAB
ALBUMIN SERPL BCP-MCNC: 2.8 G/DL (ref 3.4–5)
ALBUMIN/GLOB SERPL: 0.6 {RATIO} (ref 1.1–1.8)
ALP SERPL-CCNC: 130 U/L (ref 45–117)
ALT SERPL W P-5'-P-CCNC: 39 U/L (ref 16–61)
ANION GAP SERPL CALC-SCNC: 11.6 MEQ/L (ref 5–15)
AST SERPL W P-5'-P-CCNC: 60 U/L (ref 15–37)
BILIRUB INDIRECT SERPL-MCNC: 0.4 MG/DL (ref 0.2–0.8)
BUN BLD-MCNC: 23 MG/DL (ref 7–18)
GLOBULIN SER CALC-MCNC: 4.5 G/DL (ref 2.3–3.5)
GLUCOSE SERPLBLD-MCNC: 141 MG/DL (ref 74–106)
HCT VFR BLD CALC: 38.1 % (ref 39.6–49)
HGB BLD-MCNC: 12.5 G/DL (ref 13.6–17.9)
LYMPHOCYTES # SPEC AUTO: 0.9 K/UL (ref 0.7–4.9)
MAGNESIUM SERPL-MCNC: 1.7 MG/DL (ref 1.6–2.4)
MCH RBC QN AUTO: 28.8 PG (ref 27–35)
MCHC RBC AUTO-ENTMCNC: 32.9 G/DL (ref 32–36)
MCV RBC: 87.7 FL (ref 80–100)
NRBC # BLD: 0 10*3/UL (ref 0–0)
NRBC BLD AUTO-RTO: 0 % (ref 0–0)
PMV BLD: 9.5 FL (ref 7.6–11.3)
POTASSIUM SERPL-SCNC: 3.6 MEQ/L (ref 3.5–5.1)
RBC # BLD: 4.34 M/UL (ref 4.33–5.43)
TSH SERPL DL<=0.05 MIU/L-ACNC: 1.71 UIU/ML (ref 0.36–3.74)
WBC # BLD AUTO: 7.1 THOU/UL (ref 4.3–10.9)

## 2024-10-21 PROCEDURE — 84443 ASSAY THYROID STIM HORMONE: CPT

## 2024-10-21 PROCEDURE — 70553 MRI BRAIN STEM W/O & W/DYE: CPT

## 2024-10-21 PROCEDURE — 85730 THROMBOPLASTIN TIME PARTIAL: CPT

## 2024-10-21 PROCEDURE — 70450 CT HEAD/BRAIN W/O DYE: CPT

## 2024-10-21 PROCEDURE — 97161 PT EVAL LOW COMPLEX 20 MIN: CPT

## 2024-10-21 PROCEDURE — 82652 VIT D 1 25-DIHYDROXY: CPT

## 2024-10-21 PROCEDURE — 87077 CULTURE AEROBIC IDENTIFY: CPT

## 2024-10-21 PROCEDURE — 85025 COMPLETE CBC W/AUTO DIFF WBC: CPT

## 2024-10-21 PROCEDURE — 80076 HEPATIC FUNCTION PANEL: CPT

## 2024-10-21 PROCEDURE — 83735 ASSAY OF MAGNESIUM: CPT

## 2024-10-21 PROCEDURE — 87086 URINE CULTURE/COLONY COUNT: CPT

## 2024-10-21 PROCEDURE — 80048 BASIC METABOLIC PNL TOTAL CA: CPT

## 2024-10-21 PROCEDURE — 81003 URINALYSIS AUTO W/O SCOPE: CPT

## 2024-10-21 PROCEDURE — 82607 VITAMIN B-12: CPT

## 2024-10-21 PROCEDURE — 87186 SC STD MICRODIL/AGAR DIL: CPT

## 2024-10-21 PROCEDURE — 82947 ASSAY GLUCOSE BLOOD QUANT: CPT

## 2024-10-21 PROCEDURE — 97116 GAIT TRAINING THERAPY: CPT

## 2024-10-21 PROCEDURE — 87088 URINE BACTERIA CULTURE: CPT

## 2024-10-21 PROCEDURE — 36415 COLL VENOUS BLD VENIPUNCTURE: CPT

## 2024-10-21 PROCEDURE — 84100 ASSAY OF PHOSPHORUS: CPT

## 2024-10-21 PROCEDURE — 97530 THERAPEUTIC ACTIVITIES: CPT

## 2024-10-21 RX ADMIN — APIXABAN SCH MG: 5 TABLET, FILM COATED ORAL at 20:12

## 2024-10-21 NOTE — RAD REPORT
EXAM: CT brain without contrast



HISTORY: Right-sided weakness 



COMPARISON:



 2018



TECHNIQUE:



 Multiple contiguous axial images were obtained and a CT of the brain without contrast. Sagittal and 
coronal reformats were performed.

 Automated exposure control, adjustment of the mA and/or  kV according to patient size, and/or  itera
tive reconstruction. Unless otherwise specified, incidental findings do not require dedicated

imaging follow-u



FINDINGS: 



An intracranial bleed is not seen 



Ventricles are normal caliber



No extra-axial fluid collection noted



Low-density right occipital lobe and left cerebellum compatible with old infarcts.



Low-density right raul.



No fluid within the visualized sinuses or mastoids noted.





IMPRESSION: 



Old right occipital and left cerebellar infarctions



Low-density raul has more of a chronic than acute appearance.



If the patient's symptoms persist MRI of the brain would be recommended.



 was notified 5:11 PM October 21, 2024



Due to technical issues the report could not be dictated until now



Reported By: Nghia Valdes 

Electronically Signed:  10/21/2024 6:00 PM

## 2024-10-22 VITALS — SYSTOLIC BLOOD PRESSURE: 113 MMHG | TEMPERATURE: 98.5 F | DIASTOLIC BLOOD PRESSURE: 61 MMHG

## 2024-10-22 LAB
SQUAMOUS URNS QL MICRO: <5 /HPF
UA COMPLETE W REFLEX CULTURE PNL UR: (no result)
UA DIPSTICK W REFLEX MICRO PNL UR: (no result)

## 2024-10-22 RX ADMIN — METFORMIN HYDROCHLORIDE SCH MG: 500 TABLET, FILM COATED ORAL at 08:26

## 2024-10-22 RX ADMIN — VALSARTAN SCH MG: 160 TABLET ORAL at 08:26

## 2024-10-22 RX ADMIN — HYDRALAZINE HYDROCHLORIDE SCH MG: 25 TABLET, FILM COATED ORAL at 08:26

## 2024-10-22 RX ADMIN — VENLAFAXINE HYDROCHLORIDE SCH MG: 75 CAPSULE, EXTENDED RELEASE ORAL at 08:26

## 2024-10-22 RX ADMIN — METOPROLOL TARTRATE SCH MG: 50 TABLET, FILM COATED ORAL at 08:27

## 2024-10-22 NOTE — CON
Reason For Consultation:  Consultation was called by Dr. Wilkerson because of a stroke.  The patient and h
is wife were in the room.



History Of Present Illness:  Mr. Pichardo is a 74-year-old patient with dyslipidemia, hypertension, atr
ial fibrillation, who was only taking Eliquis 5 mg once a day instead of twice a day as instructed in
 addition to his depression.  The patient and his wife did note there was a stroke which occurred a f
ew years ago.  At that time, he did contact Emergency Services and was taken down and received thromb
olysis and did recover fully.  His event that brought him to the hospital began around Saturday when 
he developed some difficulty with coordination, balance, and gait and did communicate with his primar
 care physician.  He was eventually sent to be directly admitted to the hospital by Dr. Humphreys for [facility] workup.  It was communicated with Dr. Humphreys of the more appropriate approach would be for the pa
tient to call the emergency medical services as the 911 services and the patient to be immediately br
ought in for a code stroke and through the emergency room for immediate code stroke workup, where his
 NIH Stroke Scale will be assessed.  He will be ruled out for any contraindications and if he is with
in the window of 4.5 hours of an acute event and a negative head CT scan, be administered in case for
 stroke reversal.  Dr. Wilkerson did agree that this would be the approach to be taken in the future for a
ll the patients with stroke-like symptoms and to be directly admitted.  The patient did have while ho
spitalized, brain MRI done earlier today.  The study showed no acute intracranial abnormalities.  No 
evidence of an acute stroke or abnormal enhancement.  The study did identify remote bilateral cerebel
lar infarcts, a right pontine infarct, remote right temporal and right occipital lobe infarcts.  The 
presence of these multiple infarcts and chronic age appear to be consistent with cardioembolic events
 from the patient's atrial fibrillation.



Past Medical History:  As noted above.



Medications:  Eliquis now 5 mg twice daily, Lipitor 80 mg at bedtime, Apresoline 50 mg 3 times daily,
 HydroDIURIL 25 mg daily, Lopressor 100 mg twice daily, Zofran 4 mg every 6 hours as needed, Diovan 1
60 mg daily, Effexor 150 mg daily.



Allergies:  NO KNOWN DRUG ALLERGIES.



Physical Examination:

Vital Signs:  Blood pressure 130/61, pulse of 80, respiratory rate 16, temperature 98.5, oxygen satur
ation 95%. 

HEENT:  Mr. Pichardo is normocephalic, atraumatic.  Sclerae anicteric.  Oropharynx pink, moist. 

Neck:  Supple. 

Chest:  Clear. 

Heart:  Irregularly irregular. 

Abdomen:  Soft. 

Extremities:  Show mild edema in lower extremities with no clubbing or cyanosis. 

Neurological:  He is alert and oriented to person, place, time, and situation.  Follows commands appr
opriately.  Cranial nerves showed no significant deficits.  Motor examination; he has symmetric stren
gth at least 4/5 proximally and distally in upper and lower extremities.  Mild stocking-glove loss to
 light, touch, temperature.  Reflexes depressed in upper and lower extremities.  Coordination intact.
  Fine finger movements, rapid alternating movement, although slow with finger-to-nose and slight pas
t-pointing.  Slight ataxia with gait.  NIH Stroke Scale 2.  He does have incoordination in terms of h
eel-to-shin, finger-to-nose.  Otherwise, no other deficits as noted by the NIH Stroke Scale just 2.



Assessment:  Mr. Pichardo is a 74-year-old patient with multiple stroke risk factors, more likely atria
l fibrillation and poor control, led to multiple chronic likely cardioembolic stroke.  MRI of the bra
in ruled out any acute ischemic hemorrhagic stroke.  His blood pressures currently are controlled.  H
is blood work shows no significant abnormalities except blood sugars around 130s to 160, LDL choleste
rol of 42.  His urinalysis suggested significant urinary tract infection with esterase 250, white blo
od cells greater than 50, red blood cells greater than 50, ketones 1+, glucose 4+, urine 3+, extreme 
turbidity.  Cultures that were done today were still pending.



Plan:  The patient will continue with his list of medications including metformin for diabetes mellit
us 1000 mg twice daily, Imodium for constipation, Zofran for nausea, Diovan for his blood pressure co
ntrol, Effexor for his depression, Eliquis should be 5 mg twice daily for stroke risk reduction, Lipi
tor 80 mg at bedtime, and after his discharge, the patient may benefit from some outpatient physical 


therapy and follow up with Dr. Dias within the month and Dr. Humphreys as scheduled.





PRISCILLA/CARRIE

DD:  10/22/2024 20:50:59Voice ID:  [id number]

DT:  10/22/2024 22:29:12Report ID:  [id number]

## 2024-10-22 NOTE — RAD REPORT
EXAM: Brain W/Wo Cont



CLINICAL INDICATION:  cva. 



TECHNIQUE: Pre-contrast sagittal and axial T1-w, and axial T2-FLAIR, GRE, and diffusion-w sequences o
f the brain with ADC maps. Post-contrast axial fat-saturated T2-w and T1-w, and sagittal volumetric

T1-w images of the brain with axial and coronal reformations. Intravenous contrast material was admin
istered for the examination.[id number]



COMPARISON: 2/16/2023, yesterday head CT



FINDINGS:

Parenchyma: No infarction on DWI. No hemorrhage. No mass or mass effect. Remote right temporal and ri
ght occipital lobe infarct. Mild chronic small vessel ischemic changes. Remote bilateral cerebellar

infarcts. Remote pontine infarct.



Abnormal Enhancement: None



Extra-axial Collection:  None



Ventricular System: Normal



Major Intracranial Flow Voids: Normal



Osseous Structures:  Expected marrow signal.



Included Orbits: Normal



Paranasal Sinuses:  Predominantly clear



Tympanomastoid Cavities:  Normal



IMPRESSION: 

No acute intracranial abnormality. Specifically, no evidence of acute infarct. No abnormal enhancemen
t. Multiple remote infarcts.



Reported By: Yash Sena 

Electronically Signed:  10/22/2024 5:54 PM

## 2024-10-22 NOTE — P.DS
Admission Date: 10/21/24


Discharge Date: 10/22/24


Disposition: ROUTINE DISCHARGE


Discharge Condition: FAIR


Hospital Course: 





TRINI HAS BEEN GENERALLY WEAK.  WIFE SAYS HE WAS NOT ABLE TO GET OUT OF CHAIR 

AFTER  BATH.  HE WASBROUGHT HERE AS HE HAD RECENT STROKE.  CT AND MRI DO NOT 

SHOW ANY NEW STROKE.  MRI WAS NOT DONE UNTIL I CHANGED TO STAT AND THAT DELAYED 

DISCHARGE.  HE IS STABLE. HE WAS TAKING ONLY ONCE DAILY ELQUIS WHEN HE IS TO 

TAKE BID.  HE IS HIGH RISK FOR CVA AND CAD. HE DOES NOT LISTEN AND WANT TO 

FOLLOW A DIET TO LOSE WEIGHT. 


Vital Signs/Physical Exam: 














Temp Pulse Resp BP Pulse Ox


 


 98.5 F   80   16   113/61   95 


 


 10/22/24 16:00  10/22/24 16:00  10/22/24 16:00  10/22/24 16:00  10/22/24 16:00








Laboratory Data at Discharge: 














WBC  7.10 thou/uL (4.3-10.9)   10/21/24  18:12    


 


Hgb  12.5 g/dL (13.6-17.9)  L  10/21/24  18:12    


 


Hct  38.1 % (39.6-49.0)  L  10/21/24  18:12    


 


Plt Count  220 thou/uL (152-406)   10/21/24  18:12    


 


APTT  36.0 SECONDS (24.3-36.9)   10/21/24  18:12    


 


Sodium  136 mEq/L (136-145)   10/21/24  18:12    


 


Potassium  3.6 mEq/L (3.5-5.1)   10/21/24  18:12    


 


BUN  23 mg/dL (7-18)  H  10/21/24  18:12    


 


Creatinine  1.46 mg/dL (0.70-1.30)  H  10/21/24  18:12    


 


Glucose  141 mg/dL ()  H  10/21/24  18:12    


 


Phosphorus  3.1 mg/dL (2.5-4.9)   10/21/24  18:12    


 


Magnesium  1.7 mg/dL (1.6-2.4)   10/21/24  18:12    


 


Total Bilirubin  0.7 mg/dL (0.2-1.0)   10/21/24  18:12    


 


AST  60 U/L (15-37)  H  10/21/24  18:12    


 


ALT  39 U/L (16-61)   10/21/24  18:12    


 


Alkaline Phosphatase  130 U/L ()  H  10/21/24  18:12    


 


LDL Cholesterol Direct  42 mg/dL (100-129)  L  10/22/24  13:22    








Home Medications: 








Atorvastatin Calcium [Lipitor] 80 mg PO BEDTIME 06/01/18 


Cyanocobalamin (Vitamin B-12) [B-12] 1,000 mcg PO DAILY 01/13/21 


Flaxseed/Omega3,6,9/Fatty Acid [Flax Seed Oil 1,300 mg Softgel] 1 each PO DAILY 

01/13/21 


Hydralazine HCl 50 mg PO TID 01/13/21 


Metoprolol Tartrate 100 mg PO BID 01/13/21 


Vitamin E 400 unit PO DAILY 01/13/21 


Colesevelam HCl 625 mg PO DAILY 10/21/24 


Dapagliflozin Propanediol [Dapagliflozin] 10 mg PO DAILY 10/21/24 


Metformin HCl [Glucophage*] 1,000 mg PO BIDWM 10/21/24 


Olmesartan/Hydrochlorothiazide [Olmesartan-Hctz 40-25 mg Tab] 1 each PO DAILY 

10/21/24 


Apixaban [Eliquis] 5 mg PO BID #60 10/22/24 


Venlafaxine HCl *Xr* [Effexor XR] 150 mg PO DAILY 10/22/24 


Venlafaxine HCl *Xr* [Effexor XR] 150 mg PO DAILY  cap 10/22/24 





New Medications: 


Apixaban [Eliquis] 5 mg PO BID #60


Physician Discharge Instructions: 


PROBLEM: Altered Mental Status





GOAL: Clear understanding of disease process





INSTRUCTIONS:





Follow up with PCP Dr Humphreys - call office for appointment


Diet: ADA 


Return to ER for any emergency


Call 2nd floor nurse's station for any questions about your stay [phone]





Activity: As tolerated





 


Followup: 


Collin Humphreys MD [Primary Care Provider] -

## 2024-10-25 LAB
1,25(OH)2D SERPL-MCNC: 47 PG/ML (ref 18–72)
1,25(OH)2D2 SERPL-MCNC: <8 PG/ML
DHVD+DHVD2+DHVD3 PNL SERPL: 47 PG/ML